# Patient Record
Sex: MALE | Race: WHITE | Employment: OTHER | URBAN - METROPOLITAN AREA
[De-identification: names, ages, dates, MRNs, and addresses within clinical notes are randomized per-mention and may not be internally consistent; named-entity substitution may affect disease eponyms.]

---

## 2017-01-10 ENCOUNTER — HOSPITAL ENCOUNTER (OUTPATIENT)
Dept: PHYSICAL THERAPY | Facility: HOSPITAL | Age: 77
Setting detail: THERAPIES SERIES
Discharge: HOME | End: 2017-01-10
Payer: MEDICARE

## 2017-01-10 ENCOUNTER — NURSE ONLY (OUTPATIENT)
Dept: LAB | Facility: HOSPITAL | Age: 77
End: 2017-01-10
Payer: MEDICARE

## 2017-01-10 DIAGNOSIS — M16.12 OSTEOARTHRITIS OF LEFT HIP: ICD-10-CM

## 2017-01-10 LAB
ALBUMIN SERPL-MCNC: 3.9 G/DL (ref 3.5–4.8)
ALP LIVER SERPL-CCNC: 68 U/L (ref 45–117)
ALT SERPL-CCNC: 26 U/L (ref 17–63)
ANTIBODY SCREEN: NEGATIVE
APTT PPP: 36.5 SECONDS (ref 25–34)
AST SERPL-CCNC: 26 U/L (ref 15–41)
BASOPHILS # BLD AUTO: 0.04 X10(3) UL (ref 0–0.1)
BASOPHILS NFR BLD AUTO: 0.9 %
BILIRUB SERPL-MCNC: 0.5 MG/DL (ref 0.1–2)
BILIRUB UR QL STRIP.AUTO: NEGATIVE
BUN BLD-MCNC: 18 MG/DL (ref 8–20)
CALCIUM BLD-MCNC: 9.1 MG/DL (ref 8.3–10.3)
CHLORIDE: 103 MMOL/L (ref 101–111)
CLARITY UR REFRACT.AUTO: CLEAR
CO2: 28 MMOL/L (ref 22–32)
COLOR UR AUTO: YELLOW
CREAT BLD-MCNC: 1.12 MG/DL (ref 0.7–1.3)
EOSINOPHIL # BLD AUTO: 0.11 X10(3) UL (ref 0–0.3)
EOSINOPHIL NFR BLD AUTO: 2.5 %
ERYTHROCYTE [DISTWIDTH] IN BLOOD BY AUTOMATED COUNT: 12.6 % (ref 11.5–16)
GLUCOSE BLD-MCNC: 81 MG/DL (ref 70–99)
GLUCOSE UR STRIP.AUTO-MCNC: NEGATIVE MG/DL
HCT VFR BLD AUTO: 48.9 % (ref 37–53)
HGB BLD-MCNC: 16.9 G/DL (ref 13–17)
IMMATURE GRANULOCYTE COUNT: 0.03 X10(3) UL (ref 0–1)
IMMATURE GRANULOCYTE RATIO %: 0.7 %
INR BLD: 0.96 (ref 0.89–1.12)
KETONES UR STRIP.AUTO-MCNC: NEGATIVE MG/DL
LEUKOCYTE ESTERASE UR QL STRIP.AUTO: NEGATIVE
LYMPHOCYTES # BLD AUTO: 1.25 X10(3) UL (ref 0.9–4)
LYMPHOCYTES NFR BLD AUTO: 28.2 %
M PROTEIN MFR SERPL ELPH: 7.5 G/DL (ref 6.1–8.3)
MCH RBC QN AUTO: 33 PG (ref 27–33.2)
MCHC RBC AUTO-ENTMCNC: 34.6 G/DL (ref 31–37)
MCV RBC AUTO: 95.5 FL (ref 80–99)
MONOCYTES # BLD AUTO: 0.41 X10(3) UL (ref 0.1–0.6)
MONOCYTES NFR BLD AUTO: 9.2 %
NEUTROPHIL ABS PRELIM: 2.6 X10 (3) UL (ref 1.3–6.7)
NEUTROPHILS # BLD AUTO: 2.6 X10(3) UL (ref 1.3–6.7)
NEUTROPHILS NFR BLD AUTO: 58.5 %
NITRITE UR QL STRIP.AUTO: NEGATIVE
PH UR STRIP.AUTO: 5 [PH] (ref 4.5–8)
PLATELET # BLD AUTO: 244 10(3)UL (ref 150–450)
POTASSIUM SERPL-SCNC: 4.2 MMOL/L (ref 3.6–5.1)
PROT UR STRIP.AUTO-MCNC: NEGATIVE MG/DL
PSA SERPL DL<=0.01 NG/ML-MCNC: 13.1 SECONDS (ref 12.3–14.8)
RBC # BLD AUTO: 5.12 X10(6)UL (ref 3.8–5.8)
RED CELL DISTRIBUTION WIDTH-SD: 44.4 FL (ref 35.1–46.3)
RH BLOOD TYPE: POSITIVE
SODIUM SERPL-SCNC: 138 MMOL/L (ref 136–144)
SP GR UR STRIP.AUTO: 1.01 (ref 1–1.03)
UROBILINOGEN UR STRIP.AUTO-MCNC: <2 MG/DL
WBC # BLD AUTO: 4.4 X10(3) UL (ref 4–13)

## 2017-01-10 PROCEDURE — 86900 BLOOD TYPING SEROLOGIC ABO: CPT

## 2017-01-10 PROCEDURE — 81001 URINALYSIS AUTO W/SCOPE: CPT

## 2017-01-10 PROCEDURE — 36415 COLL VENOUS BLD VENIPUNCTURE: CPT

## 2017-01-10 PROCEDURE — 86901 BLOOD TYPING SEROLOGIC RH(D): CPT

## 2017-01-10 PROCEDURE — 85730 THROMBOPLASTIN TIME PARTIAL: CPT

## 2017-01-10 PROCEDURE — 80053 COMPREHEN METABOLIC PANEL: CPT

## 2017-01-10 PROCEDURE — 86850 RBC ANTIBODY SCREEN: CPT

## 2017-01-10 PROCEDURE — 85025 COMPLETE CBC W/AUTO DIFF WBC: CPT

## 2017-01-10 PROCEDURE — 87081 CULTURE SCREEN ONLY: CPT

## 2017-01-10 PROCEDURE — 85610 PROTHROMBIN TIME: CPT

## 2017-01-25 ENCOUNTER — ANESTHESIA EVENT (OUTPATIENT)
Dept: SURGERY | Facility: HOSPITAL | Age: 77
End: 2017-01-25

## 2017-01-25 ENCOUNTER — SURGERY (OUTPATIENT)
Age: 77
End: 2017-01-25

## 2017-01-25 ENCOUNTER — ANESTHESIA (OUTPATIENT)
Dept: SURGERY | Facility: HOSPITAL | Age: 77
End: 2017-01-25

## 2017-01-25 ENCOUNTER — APPOINTMENT (OUTPATIENT)
Dept: GENERAL RADIOLOGY | Facility: HOSPITAL | Age: 77
DRG: 470 | End: 2017-01-25
Attending: ORTHOPAEDIC SURGERY
Payer: MEDICARE

## 2017-01-25 PROCEDURE — 76001 XR OR HIP (1 VIEWS) >60 C-ARM  (CPT=73501/76001): CPT

## 2017-01-25 PROCEDURE — 73501 X-RAY EXAM HIP UNI 1 VIEW: CPT

## 2017-01-25 NOTE — ANESTHESIA PREPROCEDURE EVALUATION
PRE-OP EVALUATION    Patient Name: Elda Estrada    Pre-op Diagnosis: LEFT HIP OSTEOARTHRITIS    Procedure(s):  LEFT ANTERIOR TOTAL HIP REPLACEMENT    Surgeon(s) and Role:     Heraclio Harris MD - Primary    Pre-op vitals reviewed.   Temp: 98 °F (36.7 °C) pulmonary ROS.                        Neuro/Psych                                        Past Surgical History    COLONOSCOPY,BIOPSY  1992    Comment adenomatous polyp    COLONOSCOPY,BIOPSY  7/27/09    Comment 5mm ascending adenomatous colon polyp, Performe Alcohol Use: Yes    Comment: glass of wine with dinner 5 days per week       Drug Use: No     Available pre-op labs reviewed.     Lab Results  Component Value Date   WBC 4.4 01/10/2017   RBC 5.12 01/10/2017   HGB 16.9 01/10/2017   HCT 48.9 01/10/2017   MCV

## 2017-01-25 NOTE — ANESTHESIA POSTPROCEDURE EVALUATION
[address] Patient Status:  Surgery Admit   Age/Gender 77year old male MRN [id number]   [facility] SURGERY Attending Brandon Barraza MD   [facility] Day # 0 PCP Dav Mireles MD       Anesthesia Post-op Note    Procedure(s):  LEF

## 2017-01-31 PROBLEM — M16.12 PRIMARY OSTEOARTHRITIS OF LEFT HIP: Status: ACTIVE | Noted: 2017-01-31

## 2017-04-20 PROBLEM — M16.11 PRIMARY OSTEOARTHRITIS OF RIGHT HIP: Status: ACTIVE | Noted: 2017-04-20

## 2017-05-02 ENCOUNTER — APPOINTMENT (OUTPATIENT)
Dept: LAB | Facility: HOSPITAL | Age: 77
End: 2017-05-02
Attending: ORTHOPAEDIC SURGERY
Payer: MEDICARE

## 2017-05-02 DIAGNOSIS — M16.11 PRIMARY OSTEOARTHRITIS OF RIGHT HIP: ICD-10-CM

## 2017-05-02 PROCEDURE — 86850 RBC ANTIBODY SCREEN: CPT

## 2017-05-02 PROCEDURE — 86900 BLOOD TYPING SEROLOGIC ABO: CPT

## 2017-05-02 PROCEDURE — 93005 ELECTROCARDIOGRAM TRACING: CPT

## 2017-05-02 PROCEDURE — 87081 CULTURE SCREEN ONLY: CPT

## 2017-05-02 PROCEDURE — 85610 PROTHROMBIN TIME: CPT

## 2017-05-02 PROCEDURE — 85730 THROMBOPLASTIN TIME PARTIAL: CPT

## 2017-05-02 PROCEDURE — 86901 BLOOD TYPING SEROLOGIC RH(D): CPT

## 2017-05-02 PROCEDURE — 93010 ELECTROCARDIOGRAM REPORT: CPT | Performed by: INTERNAL MEDICINE

## 2017-05-02 PROCEDURE — 85025 COMPLETE CBC W/AUTO DIFF WBC: CPT

## 2017-05-02 PROCEDURE — 36415 COLL VENOUS BLD VENIPUNCTURE: CPT

## 2017-05-02 PROCEDURE — 80053 COMPREHEN METABOLIC PANEL: CPT

## 2017-05-05 PROBLEM — M16.12 PRIMARY OSTEOARTHRITIS OF LEFT HIP: Status: RESOLVED | Noted: 2017-01-31 | Resolved: 2017-05-05

## 2017-05-10 ENCOUNTER — SURGERY (OUTPATIENT)
Age: 77
End: 2017-05-10

## 2017-05-10 ENCOUNTER — ANESTHESIA EVENT (OUTPATIENT)
Dept: SURGERY | Facility: HOSPITAL | Age: 77
DRG: 470 | End: 2017-05-10
Payer: MEDICARE

## 2017-05-10 ENCOUNTER — HOSPITAL ENCOUNTER (INPATIENT)
Facility: HOSPITAL | Age: 77
LOS: 1 days | Discharge: HOME HEALTH | DRG: 470 | End: 2017-05-11
Attending: ORTHOPAEDIC SURGERY | Admitting: ORTHOPAEDIC SURGERY
Payer: MEDICARE

## 2017-05-10 ENCOUNTER — ANESTHESIA (OUTPATIENT)
Dept: SURGERY | Facility: HOSPITAL | Age: 77
DRG: 470 | End: 2017-05-10
Payer: MEDICARE

## 2017-05-10 ENCOUNTER — APPOINTMENT (OUTPATIENT)
Dept: GENERAL RADIOLOGY | Facility: HOSPITAL | Age: 77
DRG: 470 | End: 2017-05-10
Attending: ORTHOPAEDIC SURGERY
Payer: MEDICARE

## 2017-05-10 DIAGNOSIS — M16.11 PRIMARY OSTEOARTHRITIS OF RIGHT HIP: Primary | ICD-10-CM

## 2017-05-10 PROCEDURE — 0SR90JA REPLACEMENT OF RIGHT HIP JOINT WITH SYNTHETIC SUBSTITUTE, UNCEMENTED, OPEN APPROACH: ICD-10-PCS | Performed by: ORTHOPAEDIC SURGERY

## 2017-05-10 PROCEDURE — 97530 THERAPEUTIC ACTIVITIES: CPT

## 2017-05-10 PROCEDURE — 85730 THROMBOPLASTIN TIME PARTIAL: CPT

## 2017-05-10 PROCEDURE — 88304 TISSUE EXAM BY PATHOLOGIST: CPT | Performed by: ORTHOPAEDIC SURGERY

## 2017-05-10 PROCEDURE — 76942 ECHO GUIDE FOR BIOPSY: CPT | Performed by: ORTHOPAEDIC SURGERY

## 2017-05-10 PROCEDURE — 88311 DECALCIFY TISSUE: CPT | Performed by: ORTHOPAEDIC SURGERY

## 2017-05-10 PROCEDURE — 3E0T3CZ INTRODUCTION OF REGIONAL ANESTHETIC INTO PERIPHERAL NERVES AND PLEXI, PERCUTANEOUS APPROACH: ICD-10-PCS | Performed by: ANESTHESIOLOGY

## 2017-05-10 PROCEDURE — 97162 PT EVAL MOD COMPLEX 30 MIN: CPT

## 2017-05-10 PROCEDURE — 76001 XR FLUOROSCOPE EXAM >1 HR EXTENSIVE (CPT=76001): CPT | Performed by: ORTHOPAEDIC SURGERY

## 2017-05-10 DEVICE — PINNACLE POROCOAT ACETABULAR SHELL SECTOR II 56MM OD
Type: IMPLANTABLE DEVICE | Site: HIP | Status: FUNCTIONAL
Brand: PINNACLE POROCOAT

## 2017-05-10 DEVICE — CORAIL HIP SYSTEM CEMENTLESS FEMORAL STEM 12/14 AMT 135 DEGREES KHO SIZE 13 HA COATED HIGH OFFSET NO COLLAR
Type: IMPLANTABLE DEVICE | Site: HIP | Status: FUNCTIONAL
Brand: CORAIL

## 2017-05-10 DEVICE — BIOLOX DELTA CERAMIC FEMORAL HEAD +5.0 36MM DIA 12/14 TAPER
Type: IMPLANTABLE DEVICE | Site: HIP | Status: FUNCTIONAL
Brand: BIOLOX DELTA

## 2017-05-10 DEVICE — PINNACLE HIP SOLUTIONS ALTRX POLYETHYLENE ACETABULAR LINER NEUTRAL 36MM ID 56MM OD
Type: IMPLANTABLE DEVICE | Site: HIP | Status: FUNCTIONAL
Brand: PINNACLE ALTRX

## 2017-05-10 RX ORDER — ONDANSETRON 2 MG/ML
4 INJECTION INTRAMUSCULAR; INTRAVENOUS AS NEEDED
Status: DISCONTINUED | OUTPATIENT
Start: 2017-05-10 | End: 2017-05-10 | Stop reason: HOSPADM

## 2017-05-10 RX ORDER — MIDAZOLAM HYDROCHLORIDE 1 MG/ML
1 INJECTION INTRAMUSCULAR; INTRAVENOUS EVERY 5 MIN PRN
Status: DISCONTINUED | OUTPATIENT
Start: 2017-05-10 | End: 2017-05-10 | Stop reason: HOSPADM

## 2017-05-10 RX ORDER — ASPIRIN 325 MG
325 TABLET ORAL 2 TIMES DAILY
Status: DISCONTINUED | OUTPATIENT
Start: 2017-05-11 | End: 2017-05-11

## 2017-05-10 RX ORDER — HYDROMORPHONE HYDROCHLORIDE 1 MG/ML
0.4 INJECTION, SOLUTION INTRAMUSCULAR; INTRAVENOUS; SUBCUTANEOUS EVERY 5 MIN PRN
Status: DISCONTINUED | OUTPATIENT
Start: 2017-05-10 | End: 2017-05-10 | Stop reason: HOSPADM

## 2017-05-10 RX ORDER — PRIMIDONE 250 MG/1
250 TABLET ORAL NIGHTLY
Status: DISCONTINUED | OUTPATIENT
Start: 2017-05-10 | End: 2017-05-11

## 2017-05-10 RX ORDER — SODIUM CHLORIDE, SODIUM LACTATE, POTASSIUM CHLORIDE, CALCIUM CHLORIDE 600; 310; 30; 20 MG/100ML; MG/100ML; MG/100ML; MG/100ML
INJECTION, SOLUTION INTRAVENOUS CONTINUOUS
Status: DISCONTINUED | OUTPATIENT
Start: 2017-05-10 | End: 2017-05-10

## 2017-05-10 RX ORDER — SODIUM CHLORIDE 9 MG/ML
INJECTION, SOLUTION INTRAVENOUS CONTINUOUS
Status: DISCONTINUED | OUTPATIENT
Start: 2017-05-10 | End: 2017-05-11

## 2017-05-10 RX ORDER — PRIMIDONE 250 MG/1
250 TABLET ORAL NIGHTLY
COMMUNITY
End: 2018-01-08

## 2017-05-10 RX ORDER — DIPHENHYDRAMINE HCL 25 MG
25 CAPSULE ORAL EVERY 4 HOURS PRN
Status: DISCONTINUED | OUTPATIENT
Start: 2017-05-10 | End: 2017-05-11

## 2017-05-10 RX ORDER — MEPERIDINE HYDROCHLORIDE 25 MG/ML
12.5 INJECTION INTRAMUSCULAR; INTRAVENOUS; SUBCUTANEOUS AS NEEDED
Status: DISCONTINUED | OUTPATIENT
Start: 2017-05-10 | End: 2017-05-10 | Stop reason: HOSPADM

## 2017-05-10 RX ORDER — ONDANSETRON 2 MG/ML
4 INJECTION INTRAMUSCULAR; INTRAVENOUS EVERY 4 HOURS PRN
Status: DISCONTINUED | OUTPATIENT
Start: 2017-05-10 | End: 2017-05-11

## 2017-05-10 RX ORDER — HYDROMORPHONE HYDROCHLORIDE 1 MG/ML
0.4 INJECTION, SOLUTION INTRAMUSCULAR; INTRAVENOUS; SUBCUTANEOUS EVERY 2 HOUR PRN
Status: DISCONTINUED | OUTPATIENT
Start: 2017-05-10 | End: 2017-05-11

## 2017-05-10 RX ORDER — OXYCODONE HYDROCHLORIDE 5 MG/1
5 TABLET ORAL EVERY 4 HOURS PRN
Status: DISCONTINUED | OUTPATIENT
Start: 2017-05-10 | End: 2017-05-11

## 2017-05-10 RX ORDER — ACETAMINOPHEN 325 MG/1
TABLET ORAL
Status: COMPLETED
Start: 2017-05-10 | End: 2017-05-10

## 2017-05-10 RX ORDER — OXYCODONE HCL 10 MG/1
TABLET, FILM COATED, EXTENDED RELEASE ORAL
Status: COMPLETED
Start: 2017-05-10 | End: 2017-05-10

## 2017-05-10 RX ORDER — DIPHENHYDRAMINE HYDROCHLORIDE 50 MG/ML
12.5 INJECTION INTRAMUSCULAR; INTRAVENOUS AS NEEDED
Status: DISCONTINUED | OUTPATIENT
Start: 2017-05-10 | End: 2017-05-10 | Stop reason: HOSPADM

## 2017-05-10 RX ORDER — LOSARTAN POTASSIUM 50 MG/1
50 TABLET ORAL DAILY
Status: DISCONTINUED | OUTPATIENT
Start: 2017-05-10 | End: 2017-05-11

## 2017-05-10 RX ORDER — POLYETHYLENE GLYCOL 3350 17 G/17G
17 POWDER, FOR SOLUTION ORAL DAILY PRN
Status: DISCONTINUED | OUTPATIENT
Start: 2017-05-10 | End: 2017-05-11

## 2017-05-10 RX ORDER — OXYCODONE HYDROCHLORIDE 15 MG/1
15 TABLET ORAL EVERY 4 HOURS PRN
Status: DISCONTINUED | OUTPATIENT
Start: 2017-05-10 | End: 2017-05-11

## 2017-05-10 RX ORDER — OXYCODONE HCL 10 MG/1
10 TABLET, FILM COATED, EXTENDED RELEASE ORAL
Status: COMPLETED | OUTPATIENT
Start: 2017-05-10 | End: 2017-05-10

## 2017-05-10 RX ORDER — BISACODYL 10 MG
10 SUPPOSITORY, RECTAL RECTAL
Status: DISCONTINUED | OUTPATIENT
Start: 2017-05-10 | End: 2017-05-11

## 2017-05-10 RX ORDER — DOCUSATE SODIUM 100 MG/1
100 CAPSULE, LIQUID FILLED ORAL 2 TIMES DAILY
Qty: 60 CAPSULE | Refills: 0 | Status: SHIPPED | OUTPATIENT
Start: 2017-05-10 | End: 2017-09-20

## 2017-05-10 RX ORDER — NALOXONE HYDROCHLORIDE 0.4 MG/ML
80 INJECTION, SOLUTION INTRAMUSCULAR; INTRAVENOUS; SUBCUTANEOUS AS NEEDED
Status: DISCONTINUED | OUTPATIENT
Start: 2017-05-10 | End: 2017-05-10 | Stop reason: HOSPADM

## 2017-05-10 RX ORDER — ERGOCALCIFEROL (VITAMIN D2) 1250 MCG
50000 CAPSULE ORAL WEEKLY
COMMUNITY
End: 2017-06-26

## 2017-05-10 RX ORDER — CYCLOBENZAPRINE HCL 5 MG
5 TABLET ORAL 3 TIMES DAILY PRN
Status: DISCONTINUED | OUTPATIENT
Start: 2017-05-10 | End: 2017-05-11

## 2017-05-10 RX ORDER — DOCUSATE SODIUM 100 MG/1
100 CAPSULE, LIQUID FILLED ORAL 2 TIMES DAILY
Status: DISCONTINUED | OUTPATIENT
Start: 2017-05-10 | End: 2017-05-11

## 2017-05-10 RX ORDER — MELATONIN
325
Status: DISCONTINUED | OUTPATIENT
Start: 2017-05-10 | End: 2017-05-11

## 2017-05-10 RX ORDER — SODIUM PHOSPHATE, DIBASIC AND SODIUM PHOSPHATE, MONOBASIC 7; 19 G/133ML; G/133ML
1 ENEMA RECTAL ONCE AS NEEDED
Status: DISCONTINUED | OUTPATIENT
Start: 2017-05-10 | End: 2017-05-11

## 2017-05-10 RX ORDER — FERROUS SULFATE 325(65) MG
1 TABLET ORAL DAILY
Qty: 30 TABLET | Refills: 0 | Status: SHIPPED | OUTPATIENT
Start: 2017-05-10 | End: 2017-09-20

## 2017-05-10 RX ORDER — DIPHENHYDRAMINE HYDROCHLORIDE 50 MG/ML
12.5 INJECTION INTRAMUSCULAR; INTRAVENOUS EVERY 4 HOURS PRN
Status: DISCONTINUED | OUTPATIENT
Start: 2017-05-10 | End: 2017-05-11

## 2017-05-10 RX ORDER — HYDROMORPHONE HYDROCHLORIDE 1 MG/ML
0.8 INJECTION, SOLUTION INTRAMUSCULAR; INTRAVENOUS; SUBCUTANEOUS EVERY 2 HOUR PRN
Status: DISCONTINUED | OUTPATIENT
Start: 2017-05-10 | End: 2017-05-11

## 2017-05-10 RX ORDER — ACETAMINOPHEN 325 MG/1
650 TABLET ORAL ONCE
Status: COMPLETED | OUTPATIENT
Start: 2017-05-10 | End: 2017-05-10

## 2017-05-10 RX ORDER — HYDROCODONE BITARTRATE AND ACETAMINOPHEN 10; 325 MG/1; MG/1
1-2 TABLET ORAL EVERY 4 HOURS PRN
Qty: 80 TABLET | Refills: 0 | Status: SHIPPED | OUTPATIENT
Start: 2017-05-10 | End: 2017-09-20

## 2017-05-10 RX ORDER — TRAMADOL HYDROCHLORIDE 50 MG/1
50 TABLET ORAL EVERY 6 HOURS
Status: DISCONTINUED | OUTPATIENT
Start: 2017-05-10 | End: 2017-05-11

## 2017-05-10 RX ORDER — ALENDRONATE SODIUM 70 MG/1
70 TABLET ORAL WEEKLY
COMMUNITY
End: 2017-10-02

## 2017-05-10 RX ORDER — ATORVASTATIN CALCIUM 10 MG/1
10 TABLET, FILM COATED ORAL NIGHTLY
Status: DISCONTINUED | OUTPATIENT
Start: 2017-05-10 | End: 2017-05-11

## 2017-05-10 RX ORDER — HYDROMORPHONE HYDROCHLORIDE 1 MG/ML
0.2 INJECTION, SOLUTION INTRAMUSCULAR; INTRAVENOUS; SUBCUTANEOUS EVERY 2 HOUR PRN
Status: DISCONTINUED | OUTPATIENT
Start: 2017-05-10 | End: 2017-05-11

## 2017-05-10 RX ORDER — DIPHENHYDRAMINE HYDROCHLORIDE 50 MG/ML
25 INJECTION INTRAMUSCULAR; INTRAVENOUS ONCE AS NEEDED
Status: ACTIVE | OUTPATIENT
Start: 2017-05-10 | End: 2017-05-10

## 2017-05-10 RX ORDER — SENNOSIDES 8.6 MG
17.2 TABLET ORAL NIGHTLY
Status: DISCONTINUED | OUTPATIENT
Start: 2017-05-10 | End: 2017-05-11

## 2017-05-10 RX ORDER — OXYCODONE HYDROCHLORIDE 10 MG/1
10 TABLET ORAL EVERY 4 HOURS PRN
Status: DISCONTINUED | OUTPATIENT
Start: 2017-05-10 | End: 2017-05-11

## 2017-05-10 RX ORDER — ACETAMINOPHEN 325 MG/1
650 TABLET ORAL 4 TIMES DAILY
Status: DISCONTINUED | OUTPATIENT
Start: 2017-05-10 | End: 2017-05-11

## 2017-05-10 RX ORDER — METOCLOPRAMIDE HYDROCHLORIDE 5 MG/ML
10 INJECTION INTRAMUSCULAR; INTRAVENOUS EVERY 6 HOURS PRN
Status: DISCONTINUED | OUTPATIENT
Start: 2017-05-10 | End: 2017-05-11

## 2017-05-10 NOTE — CONSULTS
General Medicine Consult      Reason for consult: medical management    Consulted by: ortho    PCP: Laurie Ruelas MD      History of Present Illness: Patient is a 77year old male with HTN/HL, BPH, OA, who is consulted for medical management s/p R ANEL.   No Location: [address] [place] [address],[address] [facility]    OTHER SURGICAL HISTORY  8/18/09    Comment Dr. Rocco robbins    DRAIN/INJECT LARGE JOINT/BURSA Bilateral 6/23/2015    Comment Procedure: HIP INJECTION;  Surgeon: Julia Agustin breakfast   • ceFAZolin  2 g Intravenous Q8H   • [START ON 5/11/2017] aspirin  325 mg Oral BID     Continuous Infusing Medication:  • sodium chloride 125 mL/hr at 05/10/17 0948     PRN Medication:Cyclobenzaprine HCl, oxyCODONE HCl **OR** oxyCODONE HCl **OR color, texture, turgor normal. No visible rashes or lesions. Neurologic:  Psychiatric: No focal deficits  appropriate affect,  memory intact     Diagnostics:   CBC/Chem  No results for input(s): WBC, HGB, MCV, PLT, BAND, INR in the last 72 hours.     Inv Further recommendations pending patient's clinical course.   [facility] hospitalist to continue to follow patient while in house    Patient and/or patient's family given opportunity to ask questions and note understanding and agreeing with therapeutic plan as

## 2017-05-10 NOTE — PHYSICAL THERAPY NOTE
PHYSICAL THERAPY KNEE EVALUATION - INPATIENT     Room Number: 352/352-A  Evaluation Date: 5/10/2017  Type of Evaluation: Initial  Physician Order: PT Eval and Treat    Presenting Problem: s/p R anterior ANEL 5/10/17  Reason for Therapy: Mobility Dysfunction COLONOSCOPY,DIAGNOSTIC  9/4/14    Comment cecal polyp, diverticulosis    COLONOSCOPY,BIOPSY N/A 9/4/2014    Comment Procedure: COLONOSCOPY, POSSIBLE BIOPSY, POSSIBLE POLYPECTOMY 45147;  Surgeon: Joseline Holder MD;  Location: [address] [place] [address] flexion limited    Lower extremity strength is within functional limits except for the following:    Right Hip flexion  3/5  Right Knee flexion  3/5    BALANCE  Static Sitting: Fair  Dynamic Sitting: Fair  Static Standing: Poor +  Dynamic Standing: Poor + balance/safety. Pt ambulates with step to gait pattern and decreased stance time on R LE. Pt returned to room sitting up in bedside chair. Pt reports dizziness. BP: 82/50. Reviewed precautions. BP: 97/60. Pt reports decrease in symptoms. Reviewed POC.  Pt l supervison    Goal #3    Patient is able to ambulate 500 feet with assistive device at assistance level: modified  independent    Goal #4    Patient will negotiate 4 stairs/one curb w/ assistive device and supervision    Goal #5    AROM 0 degrees extension

## 2017-05-10 NOTE — ANESTHESIA POSTPROCEDURE EVALUATION
[address] Patient Status:  Surgery Admit   Age/Gender 77year old male MRN [id number]   Location [facility] Attending Aditi Israel MD   [facility] Day # 0 PCP Annalee Mcconnell MD       Anesthesia Post-op Note

## 2017-05-10 NOTE — OPERATIVE REPORT
TOTAL HIP REPLACEMENT OPERATIVE REPORT    Syd Kingsley       [id number]     11/22/1940    PRE-OP DX:  RIGHT HIP PRIMARY OSTEOARTHRITIS  POST-OP DX:  RIGHT HIP PRIMARY OSTEOARTHRITIS  PROCEDURE:  DIRECT ANTERIOR RIGHT TOTAL HIP REPLACEMENT  SURGEON:  Mellisa Mcfadden JEWELL.  FEMORAL HEAD WAS REMOVED WITH A CORK SCREW. POSTERIOR AND INFERIOR ACETABULAR RETRACTORS WERE PLACED CAREFULLY. GOOD ACETABULAR EXPOSURE WAS OBTAINED. LABRAL TISSUE WAS EXCISED. MEDIAL WALL WAS IDENTIFIED AND CLEARED OF SOFT TISSUES.   Nida Fu GOOD TENSION. ALL THE TRIAL IMPLANTS WERE REMOVED. WOUND WAS IRRIGATED COPIOUSLY. HEMOSTASIS WAS OBTAINED. ACETABULUM WAS REEXPOSED. REAL LINER WAS IMPACTED. ITS SEATING WAS VERIFIED. PROXIMAL FEMUR WAS EXPOSED.   REAL FEMORAL STEM WAS INSERTED WITH

## 2017-05-10 NOTE — ANESTHESIA PREPROCEDURE EVALUATION
PRE-OP EVALUATION    Patient Name: Reginaldo Hawkins    Pre-op Diagnosis: RIGHT HIP OSTEOARTHRITIS     Procedure(s):  RIGHT ANTERIOR HIP REPLACEMENT    Surgeon(s) and Role:     Melissa Cody MD - Primary    Pre-op vitals reviewed.   Temp: 98.1 °F (36.7 °C)  P 1992    Comment adenomatous polyp    COLONOSCOPY,BIOPSY  7/27/09    Comment 5mm ascending adenomatous colon polyp, Performed by Jabier Ryan at [facility]    COLONOSCOPY  3/17/02    Comment wnl    REMV CATARACT EXTRACAP,INSERT LENS  10/ pre-op labs reviewed.     Lab Results  Component Value Date   WBC 4.2 05/02/2017   RBC 5.17 05/02/2017   HGB 16.3 05/02/2017   HCT 49.5 05/02/2017   MCV 95.7 05/02/2017   MCH 31.5 05/02/2017   MCHC 32.9 05/02/2017   RDW 12.8 05/02/2017   .0 05/02/201

## 2017-05-10 NOTE — PROGRESS NOTES
NURSING ADMISSION NOTE      Patient admitted via BED  Oriented to room. Safety precautions initiated. Bed in low position. Call light in reach. Alert/oriented. Numbness/tingling to BLE, barely able to wiggle toes.  States pain is minimal, declines

## 2017-05-10 NOTE — H&P
Selam Artis   5/5/2017 1:00 PM   Office Visit   MRN:  [id number]    Description: 77year old male   Provider: Corie Kim MD   Department: [facility]             Scanning Cover Sheet         Click to print Barcode Encounter Cover Sheet for scanning       BPH (benign prostatic hyperplasia)      Basal cell carcinoma of head      Osteoarthritis      hips       Surgical History as of 5/5/2017      Procedure Laterality Date     COLONOSCOPY,BIOPSY  1992     adenomatous polyp      COLONOSCOPY,BIOPSY  7/27/09 Encounter-Level Documents:      There are no encounter-level documents.       Progress Notes      Connie Cavanaugh MD at 5/5/2017  1:28 PM      Status: Signed : Connie Cavanaugh MD (Physician)     Expand All Collapse All    Rivas Cruz is a 77year old   Comment  Performed by Reyna Becerra at [address] [place] [address]   9/4/14      Comment  cecal polyp, diverticulosis      COLONOSCOPY,BIOPSY  N/A  9/4/2014      Comment  Procedure: COLONOSCOPY, POSSIBLE BIOPSY, POSSIBLE POLYPEC Alendronate Sodium 70 MG Oral Tab  Take 1 tablet once a week. Take with water only. No food, drink or pills for 1 hour. Remain upright. (Patient taking differently: Take 1 tablet once a week. Take with water only. No food, drink or pills for 1 hour.  Remain PSYCHIATRIC: alert and oriented x 3; affect appropriate        ASSESSMENT/ PLAN:    Medically cleared for surgery  Advised to take BP med on am of surgery  preop labs and EKG OK  Copy of clearance sent to Dr Alix Goetz    [id number]           Please see PCP note.   +

## 2017-05-10 NOTE — PROGRESS NOTES
Operative leg measured for tubigrip. Size D applied to right leg. Patient and wife instructed; verbalized understanding.

## 2017-05-11 VITALS
SYSTOLIC BLOOD PRESSURE: 105 MMHG | TEMPERATURE: 97 F | HEART RATE: 79 BPM | WEIGHT: 169 LBS | DIASTOLIC BLOOD PRESSURE: 63 MMHG | OXYGEN SATURATION: 97 % | HEIGHT: 70 IN | BODY MASS INDEX: 24.2 KG/M2 | RESPIRATION RATE: 18 BRPM

## 2017-05-11 PROCEDURE — 85025 COMPLETE CBC W/AUTO DIFF WBC: CPT | Performed by: HOSPITALIST

## 2017-05-11 PROCEDURE — 97116 GAIT TRAINING THERAPY: CPT

## 2017-05-11 PROCEDURE — 97150 GROUP THERAPEUTIC PROCEDURES: CPT

## 2017-05-11 PROCEDURE — 97165 OT EVAL LOW COMPLEX 30 MIN: CPT

## 2017-05-11 PROCEDURE — 97535 SELF CARE MNGMENT TRAINING: CPT

## 2017-05-11 RX ORDER — HYDROCODONE BITARTRATE AND ACETAMINOPHEN 10; 325 MG/1; MG/1
2 TABLET ORAL EVERY 4 HOURS PRN
Status: DISCONTINUED | OUTPATIENT
Start: 2017-05-11 | End: 2017-05-11

## 2017-05-11 RX ORDER — HYDROCODONE BITARTRATE AND ACETAMINOPHEN 10; 325 MG/1; MG/1
1 TABLET ORAL EVERY 4 HOURS PRN
Status: DISCONTINUED | OUTPATIENT
Start: 2017-05-11 | End: 2017-05-11

## 2017-05-11 NOTE — CM/SW NOTE
05/11/17 1200   CM/SW Referral Data   Referral Source Physician   Reason for Referral Discharge planning   Informant Patient;Spouse   Pertinent Medical Hx   Primary Care Physician Name DENIS Brown MD   Patient Info   Patient's Mental Status Alert;Oriented completed.

## 2017-05-11 NOTE — PROGRESS NOTES
Patient and  (wife) attended group discharge education class. Discharge education provided utilizing \"hip/knee replacement discharge instructions\" sheet. Teach back done. Questions solicited and answered. Tolerated activity well.

## 2017-05-11 NOTE — PROGRESS NOTES
[facility] hospitalist daily note  Patient was seen/examined on 5/11/17    S; no chest pain, no SOB, no abd pain, patient is passign gas, denies bleeding    medications in EPIC    Pe;   05/11/17  1556   BP: 105/63   Pulse: 79   Temp: 97.4 °F (36.3 °C)   Resp:

## 2017-05-11 NOTE — OCCUPATIONAL THERAPY NOTE
OCCUPATIONAL THERAPY QUICK EVALUATION - INPATIENT    Room Number: 352/352-A  Evaluation Date: 5/11/2017     Type of Evaluation: Quick Eval  Presenting Problem: s/p R ANEL 5/10/17    Physician Order: IP Consult to Occupational Therapy  Reason for Therapy:  A [facility]  9/4/14    Comment cecal polyp, diverticulosis    COLONOSCOPY,BIOPSY N/A 9/4/2014    Comment Procedure: COLONOSCOPY, POSSIBLE BIOPSY, POSSIBLE POLYPECTOMY 97794;  Surgeon: Sumi Jarquin MD;  Location:  extremity strength is within functional limits     NEUROLOGICAL FINDINGS  Neurological Findings: None                ACTIVITIES OF DAILY LIVING ASSESSMENT  AM-PAC ‘6-Clicks’ Inpatient Daily Activity Short Form  How much help from another person does the pa without AE. Patient also educated on OT role, safety, fall prevention, pain management, car transfers with good verbal understanding. Patient End of Session: Up in chair;Needs met;Call light within reach; All patient questions and concerns addressed; Oth

## 2017-05-11 NOTE — PROGRESS NOTES
Orthopedic surgery progress note    Melany Bowers Patient Status:  Inpatient    1940 MRN [id number]   [facility] 3SW-A Attending Aditi Israel MD   Hosp Day # 1 PCP Annalee Mcconnell MD       Subjective:  No major complaints.   No calf pain

## 2017-05-11 NOTE — PAYOR COMM NOTE
Attending Physician: Jarrod Calvo MD    Review Type: ADMISSION   Reviewer: Dorinda Phalen       Date: May 11, 2017 - 8:27 AM  Payor: [address] [place] [address] Number: [id number]  Admit date: 5/10/2017  5:02 AM   Admitted from Emergency agreeing with therapeutic plan as outlined    Thank you for allowing me to participate in the care of this patient.      Fariba Sullivan MD  [facility] Hospitalist  Pager [phone]        MEDICATIONS ADMINISTERED IN LAST 1 DAY:  acetaminophen (TYLENOL) t Susanna Stevenson) tab 50 mg     Date Action Dose Route User    5/11/2017 0500 Given 50 mg Oral Alex Guest, RN    5/10/2017 2350 Given 50 mg Oral Alex Sarah, RN    5/10/2017 1817 Given 50 mg Oral Nel Watson, RN        RESULTS LAST 24HRS:  Labs Re

## 2017-05-11 NOTE — CM/SW NOTE
Face to Face requested from [facility]- initiated. AVS and F2F sent via [address]. Agency aware of d/c today with start of care at home for tomorrow.

## 2017-05-11 NOTE — CERTIFICATION
**Certification    PHYSICIAN Certification of Need for Inpatient Hospitalization    Based on the his current state of illness, Megha Ross requires inpatient hospitalization for his orthopedic surgery

## 2017-05-11 NOTE — PHYSICAL THERAPY NOTE
PHYSICAL THERAPY HIP TREATMENT NOTE - INPATIENT      Room Number: 352/352-A     Session: 1 and 2   Number of Visits to Meet Established Goals: 5    Presenting Problem: s/p R anterior ANEL 5/10/17    Problem List  Active Problems:    * No active hospital pro SURGICAL HISTORY      Comment TURP    OTHER SURGICAL HISTORY  8/18/09    Comment Dr. Chris robbins [place]    DRAIN/INJECT LARGE JOINT/BURSA Bilateral 6/23/2015    Comment Procedure: HIP INJECTION;  Surgeon: Valentino Dare, DO;  Location: [address] [place] [address] Score (AM-PAC Scale): 53.28   CMS Modifier (G-Code): CJ    FUNCTIONAL ABILITY STATUS  Gait Assessment   Gait Assistance: Supervision  Distance (ft): 300  Assistive Device: Rolling walker  Pattern: R Decreased stance time; Shuffle  Stoop/Curb Assistance: Not addressed;SCDs in place; Ice applied    ASSESSMENT     Pt seen BID in PT group for gait training, stair/curb step training, and BLE strengthening: S/P R ANEL - Anterior.     Results of the AM-PAC \"6 clicks\" Inpatient Daily Mobility Short Form for the [facility]

## 2017-05-12 NOTE — CM/SW NOTE
05/12/17 0700   Discharge disposition   Discharged to: Home-Health   Name of [address]   Discharge transportation Private car   DC 5/11/17

## 2017-05-22 PROBLEM — I77.9 BILATERAL CAROTID ARTERY DISEASE (HCC): Status: ACTIVE | Noted: 2017-05-22

## 2017-05-22 PROBLEM — I27.20 PULMONARY HTN (HCC): Status: ACTIVE | Noted: 2017-05-22

## 2017-05-26 PROBLEM — Z96.641 STATUS POST TOTAL REPLACEMENT OF RIGHT HIP: Status: ACTIVE | Noted: 2017-05-26

## 2017-09-20 PROBLEM — Z96.641 STATUS POST TOTAL REPLACEMENT OF RIGHT HIP: Status: RESOLVED | Noted: 2017-05-26 | Resolved: 2017-09-20

## 2017-09-20 PROBLEM — I27.20 PULMONARY HTN (HCC): Status: RESOLVED | Noted: 2017-05-22 | Resolved: 2017-09-20

## 2017-09-20 PROBLEM — M16.11 PRIMARY OSTEOARTHRITIS OF RIGHT HIP: Status: RESOLVED | Noted: 2017-04-20 | Resolved: 2017-09-20

## 2017-10-02 PROCEDURE — 83970 ASSAY OF PARATHORMONE: CPT | Performed by: INTERNAL MEDICINE

## 2018-04-19 PROBLEM — I51.5 CARDIAC CALCIFICATION (HCC): Status: ACTIVE | Noted: 2018-04-19

## 2018-06-07 PROBLEM — H44.23 DEGENERATIVE MYOPIA OF BOTH EYES: Status: ACTIVE | Noted: 2018-06-07

## 2018-06-07 PROBLEM — H31.012 MACULA SCAR OF POSTERIOR POLE OF LEFT EYE: Status: ACTIVE | Noted: 2018-06-07

## 2018-06-07 PROBLEM — H26.491 PCO (POSTERIOR CAPSULAR OPACIFICATION), RIGHT: Status: ACTIVE | Noted: 2018-06-07

## 2018-06-07 PROBLEM — Z96.1 PSEUDOPHAKIA: Status: ACTIVE | Noted: 2018-06-07

## 2019-01-07 PROBLEM — H44.23 DEGENERATIVE MYOPIA OF BOTH EYES: Status: RESOLVED | Noted: 2018-06-07 | Resolved: 2019-01-07

## 2019-01-07 PROBLEM — H26.491 PCO (POSTERIOR CAPSULAR OPACIFICATION), RIGHT: Status: RESOLVED | Noted: 2018-06-07 | Resolved: 2019-01-07

## 2019-01-07 PROBLEM — Z96.1 PSEUDOPHAKIA: Status: RESOLVED | Noted: 2018-06-07 | Resolved: 2019-01-07

## 2019-03-15 PROBLEM — IMO0002 TRIGGER FINGER OF BOTH HANDS: Status: ACTIVE | Noted: 2019-03-15

## 2020-01-28 PROCEDURE — 88305 TISSUE EXAM BY PATHOLOGIST: CPT | Performed by: INTERNAL MEDICINE

## 2020-02-04 ENCOUNTER — WALK IN (OUTPATIENT)
Dept: URGENT CARE | Age: 80
End: 2020-02-04

## 2020-02-04 DIAGNOSIS — Z23 NEED FOR SHINGLES VACCINE: Primary | ICD-10-CM

## 2020-02-04 PROCEDURE — 90471 IMMUNIZATION ADMIN: CPT | Performed by: NURSE PRACTITIONER

## 2020-02-04 PROCEDURE — 90750 HZV VACC RECOMBINANT IM: CPT | Performed by: NURSE PRACTITIONER

## 2020-02-07 PROBLEM — I51.5 CARDIAC CALCIFICATION (HCC): Status: RESOLVED | Noted: 2018-04-19 | Resolved: 2020-02-07

## 2020-03-02 ENCOUNTER — TELEPHONE (OUTPATIENT)
Dept: SCHEDULING | Age: 80
End: 2020-03-02

## 2020-03-10 ENCOUNTER — TELEPHONE (OUTPATIENT)
Dept: SCHEDULING | Age: 80
End: 2020-03-10

## 2021-02-08 PROBLEM — IMO0002 TRIGGER FINGER OF BOTH HANDS: Status: RESOLVED | Noted: 2019-03-15 | Resolved: 2021-02-08

## 2021-03-15 DIAGNOSIS — Z23 NEED FOR VACCINATION: ICD-10-CM

## 2021-03-16 ENCOUNTER — IMMUNIZATION (OUTPATIENT)
Dept: LAB | Age: 81
End: 2021-03-16

## 2021-03-16 DIAGNOSIS — Z23 NEED FOR VACCINATION: Primary | ICD-10-CM

## 2021-03-16 PROCEDURE — 91300 COVID 19 PFIZER-BIONTECH: CPT

## 2021-03-16 PROCEDURE — 0001A COVID 19 PFIZER-BIONTECH: CPT

## 2021-04-08 ENCOUNTER — IMMUNIZATION (OUTPATIENT)
Dept: LAB | Age: 81
End: 2021-04-08
Attending: HOSPITALIST

## 2021-04-08 DIAGNOSIS — Z23 NEED FOR VACCINATION: Primary | ICD-10-CM

## 2021-04-08 PROCEDURE — 91300 COVID 19 PFIZER-BIONTECH: CPT | Performed by: NURSE PRACTITIONER

## 2021-04-08 PROCEDURE — 0002A COVID 19 PFIZER-BIONTECH: CPT | Performed by: NURSE PRACTITIONER

## (undated) DEVICE — TOTAL HIP CDS: Brand: MEDLINE INDUSTRIES, INC.

## (undated) DEVICE — TIBURON DRAPE TOWELS: Brand: CONVERTORS

## (undated) DEVICE — Device: Brand: PLUMEPEN

## (undated) DEVICE — DECANTER BAG 9": Brand: MEDLINE INDUSTRIES, INC.

## (undated) DEVICE — 3M™ COBAN™ NL STERILE NON-LATEX SELF-ADHERENT WRAP, 2084S, 4 IN X 5 YD (10 CM X 4,5 M), 18 ROLLS/CASE: Brand: 3M™ COBAN™

## (undated) DEVICE — DRESSING AQUACEL AG 3.5 X 10

## (undated) DEVICE — DERMABOND LIQUID ADHESIVE

## (undated) DEVICE — KENDALL SCD EXPRESS SLEEVES, KNEE LENGTH, MEDIUM: Brand: KENDALL SCD

## (undated) DEVICE — GOWN SURG AERO CHROME XXL

## (undated) DEVICE — DRAPE C-ARM UNIVERSAL

## (undated) DEVICE — PADDING CAST COTTON  4

## (undated) DEVICE — INSTRUMENT FEE

## (undated) DEVICE — BLADE ELECTRODE: Brand: EDGE

## (undated) DEVICE — Device: Brand: STABLECUT®

## (undated) DEVICE — SPECIMEN CONTAINER,POSITIVE SEAL INDICATOR, OR PACKAGED: Brand: PRECISION

## (undated) DEVICE — BIPOLAR SEALER 23-112-1 AQM 6.0: Brand: AQUAMANTYS™

## (undated) DEVICE — WRAP COOLING HIP W/ICE PILLOW

## (undated) DEVICE — STERILE POLYISOPRENE POWDER-FREE SURGICAL GLOVES: Brand: PROTEXIS

## (undated) DEVICE — 6617 IOBAN II PATIENT ISOLATION DRAPE 5/BX,4BX/CS: Brand: STERI-DRAPE™ IOBAN™ 2

## (undated) DEVICE — SUTURE NABSB OTHCRD VIOL TIE

## (undated) DEVICE — SUTURE VICRYL 2-0 CP-1

## (undated) DEVICE — PILLOW ABDUCTION HIP SM

## (undated) DEVICE — GLOVE RADIATION SZ 8-1/2

## (undated) DEVICE — 5.5MM ROUND FAST CUTTING BUR

## (undated) DEVICE — CSTM LAPAROTOMY DRAPE PACK: Brand: MEDLINE INDUSTRIES, INC.

## (undated) DEVICE — HOOD, PEEL-AWAY: Brand: FLYTE

## (undated) NOTE — IP AVS SNAPSHOT
[facility] [address] [place], [address] ~ [phone]                Discharge Summary   5/10/2017    Miriam Otero           Admission Information        Provider Department    5/10/2017 Lester Hubbard MD  3sw-A         Than while taking norco. Do not exceed 3,000 mg of acetaminophen in 24 hours. Take 1-2 tablets by mouth every 4 (four) hours as needed for Pain. Not to exceed 10 tabs in 24 hours.     Carmie Fees taking these m Please verify or make your first post operative appointment by calling Dr. Cecilia Arango office. It should be with Sheila Baum, Dr. Cecilia Arango physician assistant, within next 10-21 days. Call Dr. Cecilia Arango office if:  You have fever over 100.4.   Wound looks ve Hip Replacement Discharge Instructions    Activity    Bathing  ? No tub baths, pools, or saunas until cleared by surgeon (about 4-6 weeks because it takes that long for the incision on the skin to heal and be a barrier to prevent infection.)  ?  When allowe needed for hip patients because of location of incision-don’t want contamination from bathroom use)  ? Continue this until your first visit with your surgeon’s office. ?  There could be a small amount of redness around the staples or incision; this is nor ? As you have less discomfort, decrease the amount of pain medication you take. Use plain Tylenol (acetaminophen) for less severe pain. ? Some pain medications have Tylenol (acetaminophen) in them such as Norco and Percocet.  Do NOT take Tylenol (acetamino ? Continue using incentive spirometry because narcotics make you sleepy so you may not take good deep breaths. We do not want you to get pneumonia. Post op Office visits  ? Schedule 10 days to 3 weeks after surgery WITH SURGEON’s office. ?  Additiona ? Check with you surgeon when you are allowed to travel so you don’t set yourself up for greater chance of complications. ? If traveling by car, get out to stretch every 2 hours. This helps prevent stiffness.   You may need to do this any time you travel odor or green/yellow discharge around incision site)  5. difficulty breathing, headache or visual disturbances  6. hives  7. persistent dizziness or light-headedness  8. extreme fatigue  9. Numbness/tingling  10.  Difficulty urinating or defecating  11. ble [facility] Mcnulty Physical Therapy Department Jasvir Mcnulty at [address] [place] [address])    [address] [place] [address].  [facility]   [phone]            Oct 09, 2017 10:40 AM   FOLLOW UP with Tre Pop MD   [address] Metabolic Lab Results  (Last result in the past 90 days)    ALT Bilirubin,Total Total Protein Albumin Sodium Potassium Chloride    (05/02/17)  30 (05/02/17)  0.4 (05/02/17)  7.6 (05/02/17)  3.8 (05/02/17)  141 (05/02/17)  4.2 (05/02/17)  106      Pending L For medical emergencies, dial 911.             _____________________________________________________________________________    Medication Side Effects - Medications to be taken at home  As your caregivers, we want you to be aware of the medications you a What to report to your healthcare team: Pain, swelling, rash, fever           Narcotic Medications     HYDROcodone-acetaminophen (NORCO)  MG Oral Tab       Use:  Treat pain   Most common side effects: Constipation, drowsiness, dizziness, urinary rete

## (undated) NOTE — IP AVS SNAPSHOT
Patient Demographics     Address Phone E-mail Address    Veronique Esquivel [phone] [id number] [facility])  [phone] (Mobile) *Preferred* [email or web address]      Emergency Contact(s)     Name Relation Home Work Joe anti-inflammatory medications. Norco 10 mg 1-2 tab(s) every 4 hours as needed for pain. Do not take more than 10 tabs daily. Do not drive while you are under influence of Norco.  Ferrous sulfate (iron) 325 mg 1 tab daily:   This medication promotes you ? For hip replacement surgery, follow instructions provided by physical therapy    No smoking  ? Avoid smoking. It is known to cause breathing problems and can decrease the rate of healing. Incision care/Dressing changes  ?  Wash hands before and after d ? Surgical discomfort is normal for one to two months. ? Have realistic goals and keep a positive outlook. ? Keep pain manageable; pain should not disrupt your sleep or activities like getting out of bed or walking.   ? You may need pain medication regula or are visiting. ? Keep bed linen/clothing freshly laundered. ? Do not allow others or pets to touch your incision. ? Avoid people that have colds or the flu. ?  Your surgeon may recommend that you take antibiotics before you undergo any dental or other ? Turning in or out of surgical leg that is new  ? Increased numbness, tingling to leg  ? Inability to walk or put weight on your surgical leg      Signs of blood clot  ?  Pain, excessive tenderness, redness, or swelling in leg or calf (other than incision provider    Please check blood pressure twice daily, keep record and bring to the appointment with the Doctor    Please seek medical attention for systolic blood pressure less than 90 mm or greater than 160 mm    Notify physician if you experience any of t Take 1 capsule (100 mg total) by mouth 2 (two) times daily. Rosina Tse                              ergocalciferol 93829 units Caps   Commonly known as:  ERGOCALCIFEROL        Take 50,000 Units by mouth once a week.                          Twan Bring a paper prescription for each of these medications    - HYDROcodone-acetaminophen  MG Tabs            352-352-A - MAR ACTION REPORT  (last 24 hrs)    ** SITE UNKNOWN **     Order ID Medication Name Action Time Action Reason Comments    [id number] Ordering provider: Ryan Doss MD  05/10/17 2300 Resulting lab: [facility]    Narrative: The following orders were created for panel order CBC WITH DIFFERENTIAL WITH PLATELET.   Procedure                               Abnormality Allergies as of 5/5/2017  Review Complete On: 5/5/2017 By: Anastasia Rodriguez CMA     No Known Allergies       Problem List as of 5/5/2017  Date Reviewed: 5/5/2017              Priority Class Noted - Resolved     Hypercholesterolemia   6/10/2008 - Present   Performed by Isidro Canada at [address] [place] [address]  1/26/2012     Performed by Isidro Canada at [facility]  1/26/2012     Performed by Isidro Canada at [address] [place] [address] overestimation) and a femoral neck T-score of -2.2 (0.627 gm/cm2) and total hip T-score -2.5    •  Hyperlipidemia      •  High blood pressure      •  BPH (benign prostatic hyperplasia)      •  Basal cell carcinoma of head      •  Osteoarthritis          hi Location: [address] [place] [address]      FLUOROSCOPIC GUIDANCE NEEDLE PLACEMENT  [facility] 6/23/2015      Comment  Procedure: HIP INJECTION;  Surgeon: Akiko Delacruz DO;  Location: [address] [place] [address], [address] [place] [address]         TOTAL HIP [facility] GI: denies nausea, vomiting, constipation, diarrhea; no rectal bleeding; no heartburn  NEURO: no sensory or motor complaint,no focal weakness  PSYCHE: no symptoms of depression or anxiety  HEMATOLOGY: denies hx anemia; denies bruising    ENDOCRINE: denies Prognosis discussed.   Risks and complications including but not limited to infection, DVT, PE, nerve and blood vessel injuries, fractures, dislocations, leg length discrepancies, chronic pain and limping, blood transfusions, possible revision surgeries, an Comment Performed by Lilo Morocho at [address] [place] [address]  11/2/2011    Comment Performed by Lilo Morocho at [facility]  1/26/2012    Comment Performed by Danelle Maki mouth every 4 (four) hours as needed for Pain. Not to exceed 10 tabs in 24 hours. Disp: 80 tablet Rfl: 0   Ferrous Sulfate 325 (65 Fe) MG Oral Tab Take 1 tablet (325 mg total) by mouth daily.  Disp: 30 tablet Rfl: 0   docusate sodium (COLACE) 100 MG Oral Ca Including negative for chest pain, shortness of breath, syncope.        OBJECTIVE:  /74 mmHg  Pulse 60  Temp(Src) 98 °F (36.7 °C) (Oral)  Resp 18  Ht 5' 10\" (1.778 m)  Wt 169 lb (76.658 kg)  BMI 24.25 kg/m2  SpO2 95%  General:  Alert, NAD, appears st PRODUCT):  6.09mGy   FINDINGS:  4 images obtained intraoperatively demonstrate postsurgical changes of a right total hip prosthesis. Noted is a previous left total hip prosthesis. For further details please refer to intraoperative report.       5/10/2017  C :  Fanny Raza PTA (Physical Therapy Assistant)           PHYSICAL THERAPY HIP TREATMENT NOTE - INPATIENT      Room Number: 352/352-A     Session: 1 and 2   Number of Visits to Meet Established Goals: 5    Presenting Problem: s/p R anterior TH 19451;  Surgeon: Shravan Acosta MD;  Location: [address] [place] [address]    OTHER SURGICAL HISTORY      Comment TURP    OTHER SURGICAL HISTORY  8/18/09    Comment Dr. Reji robbins    DRAIN/INJECT LARGE JOINT/BURSA Bilateral 6/23/2015    Comment Proce AM-PAC Score:  Raw Score: 22   PT Approx Degree of Impairment Score: 20.91%   Standardized Score (AM-PAC Scale): 53.28   CMS Modifier (G-Code): CJ    FUNCTIONAL ABILITY STATUS  Gait Assessment   Gait Assistance: Supervision  Distance (ft): 300  Assistive D Patient End of Session: Up in chair;Needs met;Call light within reach;RN aware of session/findings; All patient questions and concerns addressed;SCDs in place; Ice applied    ASSESSMENT     Pt seen BID in PT group for gait training, stair/curb step training, Author:  Aravind Gomez PT Service:  (none) Author Type:  Physical Therapist    Filed:  5/10/2017  4:46 PM Note Time:  5/10/2017  4:37 PM Status:  Signed    :   Aravind Gomez PT (Physical Therapist)           PHYSICAL THERAPY KNEE Peri Bosworth INJECT INTRAVITREAL [facility]  1/26/2012    Comment Performed by Keya Colon at [facility]  1/26/2012    Comment Performed by Keya Colon at [facility]  9/4/14    Comm · Overall Cognitive Status:  WFL - within functional limits    RANGE OF MOTION AND STRENGTH ASSESSMENT  Upper extremity ROM and strength are within functional limits     Lower extremity ROM is within functional limits except for the following:   Right Hip sitting balance at EOB. BP: 116/70. Pt sit-stand with RW and MIN for force generation/balance. Pt performed dynamic standing balance activities- weightshifting/AM with MIN assist for balance. Pt demonstrates no buckling of R LE.  Pt educated on gait sequenc motion;Strengthening;Transfer training;Balance training  Rehab Potential : Good  Frequency (Obs): BID  Number of Visits to Meet Established Goals: 5    CURRENT GOALS   Goal #1    Patient is able to demonstrate supine - sit EOB @ level: supervision    Goal overestimation) and a femoral neck T-score of -2.2 (0.627 gm/cm2) and total hip T-score -2.5   • Hyperlipidemia    • High blood pressure    • BPH (benign prostatic hyperplasia)    • Basal cell carcinoma of head    • Osteoarthritis      hips       Past Surg Comment Procedure: HIP INJECTION;  Surgeon: Ira Quijano DO;  Location: [address] [place] [address] [place] REPLACEMENT Left 2017       HOME SITUATION  Type of Home: House  Home Layout: Two level  Lives With: Spouse    Toilet and Equ Standardized Score (AM-PAC Scale): 44.27  CMS Modifier (G-Code): CJ    FUNCTIONAL TRANSFER ASSESSMENT  Supine to Sit : Modified independent  Sit to Stand: Supervision    Skilled Therapy Provided: Education on hip precautions and incorporation into ADLs; pa OT services needed at this time.        OT Discharge Recommendations: Home with home health PT (with family assist)  OT Device Recommendations: Sock aid    PLAN  Patient has been evaluated and presents with no skilled Occupational Therapy needs at this time

## (undated) NOTE — LETTER
[facility]  Phone: [phone]  Right Fax: [phone]  Miriam Hospitalk [address] Belia Cross RN Date: 17    Patient Name: Shira Rebolledo  Surgery Date: 5/10/2017    CSN: [id number]  Medical Record: [id number]   : 6